# Patient Record
Sex: MALE | Race: WHITE
[De-identification: names, ages, dates, MRNs, and addresses within clinical notes are randomized per-mention and may not be internally consistent; named-entity substitution may affect disease eponyms.]

---

## 2020-12-17 ENCOUNTER — HOSPITAL ENCOUNTER (OUTPATIENT)
Dept: HOSPITAL 76 - LAB.S | Age: 70
Discharge: HOME | End: 2020-12-17
Attending: INTERNAL MEDICINE
Payer: MEDICARE

## 2020-12-17 DIAGNOSIS — Z00.00: Primary | ICD-10-CM

## 2020-12-17 LAB
BASOPHILS NFR BLD AUTO: 0.1 10^3/UL (ref 0–0.1)
BASOPHILS NFR BLD AUTO: 0.7 %
CHOLEST SERPL-MCNC: 172 MG/DL
EOSINOPHIL # BLD AUTO: 0.1 10^3/UL (ref 0–0.7)
EOSINOPHIL NFR BLD AUTO: 1.7 %
ERYTHROCYTE [DISTWIDTH] IN BLOOD BY AUTOMATED COUNT: 12.8 % (ref 12–15)
HBA1C MFR BLD HPLC: 5.6 % (ref 4.27–6.07)
HDLC SERPL-MCNC: 33 MG/DL
HDLC SERPL: 5.2 {RATIO} (ref ?–5)
HGB UR QL STRIP: 14.7 G/DL (ref 14–18)
LDLC SERPL CALC-MCNC: 114 MG/DL
LDLC/HDLC SERPL: 3.5 {RATIO} (ref ?–3.6)
LYMPHOCYTES # SPEC AUTO: 0.8 10^3/UL (ref 1.5–3.5)
LYMPHOCYTES NFR BLD AUTO: 10.4 %
MCH RBC QN AUTO: 31.5 PG (ref 27–31)
MCHC RBC AUTO-ENTMCNC: 32.5 G/DL (ref 32–36)
MCV RBC AUTO: 97 FL (ref 80–94)
MONOCYTES # BLD AUTO: 0.9 10^3/UL (ref 0–1)
MONOCYTES NFR BLD AUTO: 11.9 %
NEUTROPHILS # BLD AUTO: 5.7 10^3/UL (ref 1.5–6.6)
NEUTROPHILS # SNV AUTO: 7.6 X10^3/UL (ref 4.8–10.8)
NEUTROPHILS NFR BLD AUTO: 74.8 %
PDW BLD AUTO: 11.4 FL (ref 7.4–11.4)
PLATELET # BLD: 216 10^3/UL (ref 130–450)
RBC MAR: 4.66 10^6/UL (ref 4.7–6.1)
VLDLC SERPL-SCNC: 25 MG/DL

## 2020-12-17 PROCEDURE — 83721 ASSAY OF BLOOD LIPOPROTEIN: CPT

## 2020-12-17 PROCEDURE — 85025 COMPLETE CBC W/AUTO DIFF WBC: CPT

## 2020-12-17 PROCEDURE — 80061 LIPID PANEL: CPT

## 2020-12-17 PROCEDURE — 83036 HEMOGLOBIN GLYCOSYLATED A1C: CPT

## 2020-12-17 PROCEDURE — 36415 COLL VENOUS BLD VENIPUNCTURE: CPT

## 2020-12-17 PROCEDURE — 84443 ASSAY THYROID STIM HORMONE: CPT

## 2023-04-07 ENCOUNTER — HOSPITAL ENCOUNTER (OUTPATIENT)
Dept: HOSPITAL 76 - DI.S | Age: 73
Discharge: HOME | End: 2023-04-07
Attending: EMERGENCY MEDICINE
Payer: COMMERCIAL

## 2023-04-07 DIAGNOSIS — S20.221A: Primary | ICD-10-CM

## 2023-04-07 DIAGNOSIS — S40.011A: ICD-10-CM

## 2023-04-07 NOTE — XRAY REPORT
PROCEDURE:  Clavicle RT

 

INDICATIONS:  CONTUSION OF RIGHT SHOULDER

 

TECHNIQUE:  2 views of the clavicle were acquired.  

 

COMPARISON:  X-ray shoulder 4/7/2023.

 

FINDINGS:  

 

Bones:  There are several small ossifications along the undersurface of the acromial overlying the elena
int space..  No suspicious bony lesions.   Severe acromioclavicular degenerative narrowing.

 

Soft tissues:  No suspicious soft tissue calcifications or masses.  

 

IMPRESSION:  

Small ossifications underlying the inferior surface of the acromium overlying the joint space. These 
could represent loose bodies or small avulsion fracture.

 

 

 

Reviewed by: Mary Gautam MD on 4/7/2023 5:01 PM PDT

Approved by: Mary Gautam MD on 4/7/2023 5:01 PM PDT

 

 

Station ID:  [id number]

## 2023-04-07 NOTE — XRAY REPORT
PROCEDURE:  Ribs w/PA Chest RT

 

INDICATIONS:  CONTUSION OF RIGHT BACK WALL OF THORAX

 

TECHNIQUE:  3 views of the right ribs were acquired, along with a single view chest.  

 

COMPARISON:  None

 

FINDINGS:  

 

Surgical changes and devices:  None.  

 

Bones and chest wall:  No fractures or dislocations.  No suspicious bony lesions.  Overlying soft tis
sues appear unremarkable.  

 

Lungs and pleura:  No pleural effusions or pneumothorax.  Lungs appear clear.  

 

Mediastinum:  Mediastinal contours appear normal.  Heart size is normal.  

 

IMPRESSION:  

No visualized acute fracture or dislocation. However, occult injury cannot be excluded. Recommend silvina
rt interval imaging follow-up in 7-10 days as clinically indicated for additional evaluation.

 

 

 

Reviewed by: Mary Gautam MD on 4/7/2023 5:02 PM PDT

Approved by: Mary Gautam MD on 4/7/2023 5:02 PM PDT

 

 

Station ID:  [id number]

## 2023-04-07 NOTE — XRAY REPORT
PROCEDURE:  Shoulder 3 View RT

 

INDICATIONS:  CONTUSION OF RIGHT SHOULDER

 

TECHNIQUE:  3 views of the shoulder were acquired.  

 

COMPARISON:  X-ray clavicle 4/7/2023.

 

FINDINGS:  

 

Bones: Small ossifications along the undersurface of the acromium. No suspicious bony lesions.  Visua
lized ribs appear intact.  

 

Soft tissues:  No suspicious soft tissue calcifications.  

 

IMPRESSION:  Small calcifications underlying the undersurface of the acromium. These could represent 
areas of loose body versus small avulsion fracture fragments.

 

Reviewed by: Mary Gautam MD on 4/7/2023 5:02 PM PDT

Approved by: Mary Gautam MD on 4/7/2023 5:02 PM PDT

 

 

Station ID:  [id number]

## 2024-08-14 ENCOUNTER — HOSPITAL ENCOUNTER (OUTPATIENT)
Dept: HOSPITAL 76 - LAB.S | Age: 74
Discharge: HOME | End: 2024-08-14
Attending: INTERNAL MEDICINE
Payer: MEDICARE

## 2024-08-14 DIAGNOSIS — R20.0: Primary | ICD-10-CM

## 2024-08-14 DIAGNOSIS — E87.1: ICD-10-CM

## 2024-08-14 DIAGNOSIS — R79.89: ICD-10-CM

## 2024-08-14 DIAGNOSIS — D64.9: ICD-10-CM

## 2024-08-14 LAB
ANION GAP SERPL CALCULATED.4IONS-SCNC: 7 MMOL/L (ref 6–13)
BASOPHILS NFR BLD AUTO: 0 10^3/UL (ref 0–0.1)
BASOPHILS NFR BLD AUTO: 0.3 %
BUN SERPL-MCNC: 16 MG/DL (ref 6–20)
CALCIUM UR-MCNC: 9.4 MG/DL (ref 8.5–10.3)
CHLORIDE SERPL-SCNC: 100 MMOL/L (ref 101–111)
CO2 SERPL-SCNC: 27 MMOL/L (ref 21–32)
CREAT SERPLBLD-SCNC: 1.1 MG/DL (ref 0.6–1.3)
EOSINOPHIL # BLD AUTO: 0 10^3/UL (ref 0–0.7)
EOSINOPHIL NFR BLD AUTO: 0.6 %
ERYTHROCYTE [DISTWIDTH] IN BLOOD BY AUTOMATED COUNT: 14.3 % (ref 12–15)
GFRSERPLBLD MDRD-ARVRAT: 65 ML/MIN/{1.73_M2} (ref 89–?)
GLUCOSE SERPL-MCNC: 115 MG/DL (ref 74–104)
HCT VFR BLD AUTO: 39.2 % (ref 42–52)
HGB UR QL STRIP: 12.6 G/DL (ref 14–18)
LYMPHOCYTES # SPEC AUTO: 0.8 10^3/UL (ref 1.5–3.5)
LYMPHOCYTES NFR BLD AUTO: 11.8 %
MCH RBC QN AUTO: 33.5 PG (ref 27–31)
MCHC RBC AUTO-ENTMCNC: 32.1 G/DL (ref 32–36)
MCV RBC AUTO: 104.3 FL (ref 80–94)
MONOCYTES # BLD AUTO: 1.3 10^3/UL (ref 0–1)
MONOCYTES NFR BLD AUTO: 19.7 %
NEUTROPHILS # BLD AUTO: 4.4 10^3/UL (ref 1.5–6.6)
NEUTROPHILS # SNV AUTO: 6.6 X10^3/UL (ref 4.8–10.8)
NEUTROPHILS NFR BLD AUTO: 66.1 %
NRBC # BLD AUTO: 0 /100WBC
NRBC # BLD AUTO: 0 X10^3/UL
PDW BLD AUTO: 12.1 FL (ref 7.4–11.4)
PLATELET # BLD: 145 10^3/UL (ref 130–450)
POTASSIUM SERPL-SCNC: 4.1 MMOL/L (ref 3.5–4.5)
RBC MAR: 3.76 10^6/UL (ref 4.7–6.1)
SODIUM SERPLBLD-SCNC: 134 MMOL/L (ref 135–145)
TSH SERPL-ACNC: 1.77 UIU/ML (ref 0.34–5.6)

## 2024-08-14 PROCEDURE — 82746 ASSAY OF FOLIC ACID SERUM: CPT

## 2024-08-14 PROCEDURE — 82607 VITAMIN B-12: CPT

## 2024-08-14 PROCEDURE — 82525 ASSAY OF COPPER: CPT

## 2024-08-14 PROCEDURE — 36415 COLL VENOUS BLD VENIPUNCTURE: CPT

## 2024-08-14 PROCEDURE — 84443 ASSAY THYROID STIM HORMONE: CPT

## 2024-08-14 PROCEDURE — 85025 COMPLETE CBC W/AUTO DIFF WBC: CPT

## 2024-08-14 PROCEDURE — 80048 BASIC METABOLIC PNL TOTAL CA: CPT
